# Patient Record
Sex: MALE | Race: WHITE | URBAN - METROPOLITAN AREA
[De-identification: names, ages, dates, MRNs, and addresses within clinical notes are randomized per-mention and may not be internally consistent; named-entity substitution may affect disease eponyms.]

---

## 2021-12-09 ENCOUNTER — OFFICE VISIT (OUTPATIENT)
Dept: URBAN - METROPOLITAN AREA CLINIC 1 | Facility: CLINIC | Age: 36
End: 2021-12-09
Payer: COMMERCIAL

## 2021-12-09 DIAGNOSIS — E11.9 DIABETES MELLITUS TYPE 2 WITHOUT MENTION OF COMPLICATION: Primary | ICD-10-CM

## 2021-12-09 DIAGNOSIS — H47.233 GLAUCOMATOUS OPTIC ATROPHY, BILATERAL: ICD-10-CM

## 2021-12-09 DIAGNOSIS — H52.13 MYOPIA, BILATERAL: ICD-10-CM

## 2021-12-09 DIAGNOSIS — H04.123 TEAR FILM INSUFFICIENCY OF BILATERAL LACRIMAL GLANDS: ICD-10-CM

## 2021-12-09 PROCEDURE — 99203 OFFICE O/P NEW LOW 30 MIN: CPT | Performed by: SPECIALIST

## 2021-12-09 ASSESSMENT — INTRAOCULAR PRESSURE
OD: 18
OS: 16
OS: 30
OD: 20

## 2021-12-09 ASSESSMENT — VISUAL ACUITY
OS: 20/30
OD: 20/30

## 2021-12-09 NOTE — IMPRESSION/PLAN
Impression: Glaucomatous optic atrophy, bilateral: H47.233. Plan: Discussed diagnosis in detail with patient. Will continue to observe condition and or symptoms. No testing is necessary at this time. Patient will continue to come in to keep track on IOP.

## 2021-12-09 NOTE — IMPRESSION/PLAN
Impression: Diabetes mellitus Type 2 without mention of complication: L80.7. Plan: Diabetes type II: no background retinopathy, no signs of neovascularization noted. Discussed ocular and systemic benefits of blood sugar control.

## 2022-11-22 ENCOUNTER — OFFICE VISIT (OUTPATIENT)
Dept: URBAN - METROPOLITAN AREA CLINIC 1 | Facility: CLINIC | Age: 37
End: 2022-11-22
Payer: COMMERCIAL

## 2022-11-22 DIAGNOSIS — H47.233 GLAUCOMATOUS OPTIC ATROPHY, BILATERAL: Primary | ICD-10-CM

## 2022-11-22 DIAGNOSIS — H04.123 TEAR FILM INSUFFICIENCY OF BILATERAL LACRIMAL GLANDS: ICD-10-CM

## 2022-11-22 DIAGNOSIS — E11.9 DIABETES MELLITUS TYPE 2 WITHOUT MENTION OF COMPLICATION: ICD-10-CM

## 2022-11-22 PROCEDURE — 99213 OFFICE O/P EST LOW 20 MIN: CPT | Performed by: SPECIALIST

## 2022-11-22 ASSESSMENT — INTRAOCULAR PRESSURE
OS: 19
OD: 15

## 2022-11-22 ASSESSMENT — VISUAL ACUITY
OS: 20/40
OD: 20/25

## 2022-11-22 NOTE — IMPRESSION/PLAN
Impression: Diabetes mellitus Type 2 without mention of complication: T95.5. Plan: Diabetes type II: no background retinopathy, no signs of neovascularization noted. Discussed ocular and systemic benefits of blood sugar control.

## 2022-11-22 NOTE — IMPRESSION/PLAN
Impression: Glaucomatous optic atrophy, bilateral: H47.233. Plan: Patient with stable ONC OU and no evidence of glaucomatous progression. No treatment at this time. We will continue to observe closely. No further testing needed unless IOP is high.